# Patient Record
Sex: FEMALE | Race: WHITE | NOT HISPANIC OR LATINO | Employment: OTHER | URBAN - METROPOLITAN AREA
[De-identification: names, ages, dates, MRNs, and addresses within clinical notes are randomized per-mention and may not be internally consistent; named-entity substitution may affect disease eponyms.]

---

## 2022-11-01 NOTE — H&P (VIEW-ONLY)
GYNECOLOGIC ONCOLOGY CONSULT    Patient Name: RODOLFO TRIPLETT Patient : 1975   Patient MRN: [id number]    Referring Physician: Dmitri Copeland MD   Primary GYNOncologist: Clari Perez (Gynecological/Oncology)  Date of Service: 10/27/2022     Reason for Consult:  Consultation and treatment recommendations.     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 She presents today for consultation and treatment recommendations.    Clinical Course:  1/22/15: The patient was experiencing abnormal uterine bleeding and cramping. Her Pap smear was negative HPV. Pelvic US showed fibroids and possible polyp. EMB showed no evidence of malignancy, hyperplasia, or endometritis.   2/18/15: The patient underwent laparoscopic hysterectomy and bilateral salpingectomy. Pathology showed multiple intramural and subserosal leiomyoma.   7/3/17: She underwent EUA and excision of hypertrophic anal papilla. Pathology showed invasive well-differentiated squamous cell carcinoma.   She was treated with chemotherapy and radiation therapy for her stage I anal cancer in 2017.   18: CT AP showed no evidence of metastatic disease in the abdomen or pelvis. Hazy inflammatory stranding in the low pelvis favored to be on the basis of treatment related effect. Mild thickening of the urinary bladder wall, also favored to be treatment related effect, alternatively, could be on the basis of cystitis.   18: MEFV-gene analysis was negative.   20: CT AP showed no acute inflammatory process in the abdomen or pelvis. Rectum is largely decompressed. No definite rectal wall thickening or mass is identified. Tiny focus of intraluminal air in the bladder but no bladder wall thickening.   10/3/22: The patient had had a  skin tag/lesion on the left side of her labia, initially noticed in 2017. It has changed in size and caused more irritation, redness, and itching over time. She underwent excision of the left labia lesion on 10/3/22. Pathology showed extensive high-grade squamous intraepithelial lesion (GILBERTO-3) with adnexal extension. Surgical margins were all positive for GILBERTO-3.   Genetic Testing (Gyn Oncology):  Negative MEFV-gene.     Interval History (Gyn Oncology):  The patient presents to the clinic today for further consultation and treatment recommendations. She reports she is doing well. She mentions that she initially noticed a skin tag/lesion on the left side of her labia in 2017. This has changed in size over the years and has become more symptomatic recently. She mentions that she has experienced associated itching, especially after showering, sweating, etc. She also reports associated pain. These symptoms have improved significantly after she underwent the excision of the left labia lesion on 10/3/22. She is doing well symptomatically today. However, she mentions that she has been experiencing pain in her hip region. She does have arthritis in her back and hips. She denies associated fever, nausea, vomiting, shortness of breath, chest pain, cough, night sweats, and chills. She denies any abnormal vaginal bleeding or pelvic pain. She has no abdominal pain and no new changes in her bladder or bowel habits. She denies dysuria, hematuria, melena, or bright red blood per rectum. She has no abnormal lower extremity edema. She denies peripheral neuropathy.     Review of Systems:  A complete 14-point review of systems is negative except as noted in the above history of present illness.    Past Medical History:  Anal cancer   Mixed hyperlipidemia   Epigastric pain  Anxiety   Migraines  PID  Epilepsy    Surgical History:  Removal of single anal tag     EUA  Laparoscopic hysterectomy   Bilateral salpingectomy    OB/Gyn  History:  .  x2,  x1. Menarche at age 10. LMP: 2/5/15.     Allergies#  Tamiflu    Medications:  Atorvastatin Oral 10 mg tablet  Calcium Carbonate Oral 600 mg calcium (1,500 mg) tablet  Multivitamins Oral Tablet  Family History:  Mother: diabetes  Father: HTN, prostate cancer   MGF: cancer     Social History:  . Lives with  and 4 children. Works part time as caregiver for elderly. Currently every day smoker; smokes half a pack of cigarettes daily. Never used tobacco or illicit drugs. Former vape user. Does not consume alcohol.     Health Maintenance:      Vital Signs:  Blood pressure: 125/82, Pulse: 103, Temperature: , Respirations: 16, O2 sat: 98%, Pain Scale: 0, Height: 66.5 in, Weight: 166.8 lb, BSA: 1.86, BMI: 26.52 kg/m2     Physical Exam (Gyn Oncology):  General: Alert and oriented x3, no acute distress  Skin: Clean, dry and intact.  HEENT: Normocephalic, atraumatic  Lymph: No cervical, supraclavicular, inguinal, or femoral adenopathy.  CV: Regular rate and rhythm, no murmurs, rubs or gallops  Resp: Clear to auscultation bilaterally, no wheezes, rales or rhonchi  Abdomen: Soft, non-tender to palpation, no rebound or guarding  Lower Extremity Exam: No lower extremity edema, no palpable cords  Neuro: Cranial Nerves 2-12 intact, gross motor skills intact  Genitourinary exam: Examination of the external genitalia reveals a slightly bilobed 2 x 2.3 cm beefy mildly ulcerative lesion, consistent with at least GILBERTO-3. An invasive component to the lesion is not definitively identified. On speculum exam, the vaginal cuff mucosa is within normal limits without any growths of lesions. There is a mild white vaginal discharge. A bimanual exam was not performed. The adnexal lobe lesion that was hit the bilobed lesion of the lesion approximates the left lateral aspect of the patient's clitoris, however, is sufficiently distanced from the left lateral aspect of the urethra.     Laboratory  Data:         Imagin18: CT AP  IMPRESSION:  1. No evidence of metastatic disease in the abdomen or pelvis.   2. Hazy inflammatory stranding in the low pelvis favored to be on the basis of treatment related effect.   3. Mild thickening of the urinary bladder wall, also favored to be treatment related effect, alternatively, could be on the basis of cystitis.     20: CT AP  IMPRESSION:  1. No acute inflammatory process in the abdomen or pelvis.   2. Rectum is largely decompressed. No definite rectal wall thickening or mass is identified.   3. Tiny focus of intraluminal air in the bladder but no bladder wall thickening.     PATHOLOGY:  A) VULVA, LEFT LABIA, 2 O'CLOCK LATERAL TO LESION, BX: 10/3/22  1. Extensive high-grade squamous intraepithelial lesion (GILBERTO-3) with adnexal extension.  2. Focus suspicious for but not diagnostic of superficially invasive squamous cell carcinoma.     B) VULVA, LEFT LABIA, 2 O'CLOCK LESION, EXCISION: 10/3/22  1. Extensive high-grade squamous intraepithelial lesion (GILBERTO-3) with adnexal extension.  2.  Surgical margins were all positive for GILBERTO-3.     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 She presents today for consultation and treatment recommendations.  At least GILBERTO-3 of the left anterior vulva: At this time, I am unable to definitively determine if the lesion of the left lateral vulva is consistent with GILBERTO-3 or an invasive carcinoma. Therefore, I would not proceed with an upfront sentinel lymph node injection and biopsy but will perform a simple vulvectomy with local excision in order to rule out an invasive carcinoma. I reviewed with Latanya and her  in great detail that if an invasive carcinoma is identified, further surgical excision will be  required. If an invasive carcinoma is identified, we will additionally perform sentinel lymph node injection and biopsy. We discussed that if the lesion were to be consistent with a GILBERTO-3 lesion only, we would be done with excision following these simple vulvectomy. We reviewed the risks of surgery that do include bleeding, infection, and potential damage to the surrounding structures. We reviewed the general perioperative expectations. She will need a preoperative H&P and a Covid-19 test prior to the surgery. It appears as though we can schedule her procedure for November 15th.  Tobacco dependence: I discussed with Latanya and her  the risk of recurrence and progression of disease and relationship to not only her current vulgar process but also her history of anal cancer with persistent smoking. We discussed an underlying component to an infection with HPV and also discussed the correlation between smoking and progression of disease and increased risk of recurrence. Latanya was surprised to hear that her smoking could contribute to development of her cancer. She was advised also to stop smoking as soon as reasonable.  History of stage I squamous cell carcinoma of the anus: Status post chemotherapy sensitizing pelvic radiation. Due to the history of pelvic radiation, Latanya is likely to struggle with issues related to wound healing. I have strongly advised that she discontinue smoking prior to surgery in order to optimize wound healing to the best of her ability.  HealthCare Maintenance: Continue routine healthcare follow up with her primary care provider.   Thank you very much for allowing me to take part in the care of this very sweet patient.    3 minutes in reviewing records, 36 minutes in discussion and exam, 2 minutes ordering labs and sending prescriptions.    Pain Care Management:  Pain Scale: 0    Patient Care needs:  Depressions Status: Not recorded on visit  Smoking Status: Current every day  smoker    Documentation assistance provided by kitty Loveibing for Dr. Clari Gamez, on 10/28/2022. The patient and family/friends if present were apprised of the use of a scribe through remote viewing and all parties consented to conducting the visit in this manner.    Clari Perez MD  Phone: [phone]  Fax: [phone]

## 2022-11-01 NOTE — CONSULTS
GYNECOLOGIC ONCOLOGY CONSULT    Patient Name: RODOLFO TRIPLETT Patient : 1975   Patient MRN: [id number]    Referring Physician: Dmitri Copeland MD   Primary GYNOncologist: Clari Perez (Gynecological/Oncology)  Date of Service: 10/27/2022     Reason for Consult:  Consultation and treatment recommendations.     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 She presents today for consultation and treatment recommendations.    Clinical Course:  1/22/15: The patient was experiencing abnormal uterine bleeding and cramping. Her Pap smear was negative HPV. Pelvic US showed fibroids and possible polyp. EMB showed no evidence of malignancy, hyperplasia, or endometritis.   2/18/15: The patient underwent laparoscopic hysterectomy and bilateral salpingectomy. Pathology showed multiple intramural and subserosal leiomyoma.   7/3/17: She underwent EUA and excision of hypertrophic anal papilla. Pathology showed invasive well-differentiated squamous cell carcinoma.   She was treated with chemotherapy and radiation therapy for her stage I anal cancer in 2017.   18: CT AP showed no evidence of metastatic disease in the abdomen or pelvis. Hazy inflammatory stranding in the low pelvis favored to be on the basis of treatment related effect. Mild thickening of the urinary bladder wall, also favored to be treatment related effect, alternatively, could be on the basis of cystitis.   18: MEFV-gene analysis was negative.   20: CT AP showed no acute inflammatory process in the abdomen or pelvis. Rectum is largely decompressed. No definite rectal wall thickening or mass is identified. Tiny focus of intraluminal air in the bladder but no bladder wall thickening.   10/3/22: The patient had had a  skin tag/lesion on the left side of her labia, initially noticed in 2017. It has changed in size and caused more irritation, redness, and itching over time. She underwent excision of the left labia lesion on 10/3/22. Pathology showed extensive high-grade squamous intraepithelial lesion (GILBERTO-3) with adnexal extension. Surgical margins were all positive for GILBERTO-3.   Genetic Testing (Gyn Oncology):  Negative MEFV-gene.     Interval History (Gyn Oncology):  The patient presents to the clinic today for further consultation and treatment recommendations. She reports she is doing well. She mentions that she initially noticed a skin tag/lesion on the left side of her labia in 2017. This has changed in size over the years and has become more symptomatic recently. She mentions that she has experienced associated itching, especially after showering, sweating, etc. She also reports associated pain. These symptoms have improved significantly after she underwent the excision of the left labia lesion on 10/3/22. She is doing well symptomatically today. However, she mentions that she has been experiencing pain in her hip region. She does have arthritis in her back and hips. She denies associated fever, nausea, vomiting, shortness of breath, chest pain, cough, night sweats, and chills. She denies any abnormal vaginal bleeding or pelvic pain. She has no abdominal pain and no new changes in her bladder or bowel habits. She denies dysuria, hematuria, melena, or bright red blood per rectum. She has no abnormal lower extremity edema. She denies peripheral neuropathy.     Review of Systems:  A complete 14-point review of systems is negative except as noted in the above history of present illness.    Past Medical History:  Anal cancer   Mixed hyperlipidemia   Epigastric pain  Anxiety   Migraines  PID  Epilepsy    Surgical History:  Removal of single anal tag     EUA  Laparoscopic hysterectomy   Bilateral salpingectomy    OB/Gyn  History:  .  x2,  x1. Menarche at age 10. LMP: 2/5/15.     Allergies#  Tamiflu    Medications:  Atorvastatin Oral 10 mg tablet  Calcium Carbonate Oral 600 mg calcium (1,500 mg) tablet  Multivitamins Oral Tablet  Family History:  Mother: diabetes  Father: HTN, prostate cancer   MGF: cancer     Social History:  . Lives with  and 4 children. Works part time as caregiver for elderly. Currently every day smoker; smokes half a pack of cigarettes daily. Never used tobacco or illicit drugs. Former vape user. Does not consume alcohol.     Health Maintenance:      Vital Signs:  Blood pressure: 125/82, Pulse: 103, Temperature: , Respirations: 16, O2 sat: 98%, Pain Scale: 0, Height: 66.5 in, Weight: 166.8 lb, BSA: 1.86, BMI: 26.52 kg/m2     Physical Exam (Gyn Oncology):  General: Alert and oriented x3, no acute distress  Skin: Clean, dry and intact.  HEENT: Normocephalic, atraumatic  Lymph: No cervical, supraclavicular, inguinal, or femoral adenopathy.  CV: Regular rate and rhythm, no murmurs, rubs or gallops  Resp: Clear to auscultation bilaterally, no wheezes, rales or rhonchi  Abdomen: Soft, non-tender to palpation, no rebound or guarding  Lower Extremity Exam: No lower extremity edema, no palpable cords  Neuro: Cranial Nerves 2-12 intact, gross motor skills intact  Genitourinary exam: Examination of the external genitalia reveals a slightly bilobed 2 x 2.3 cm beefy mildly ulcerative lesion, consistent with at least GILBERTO-3. An invasive component to the lesion is not definitively identified. On speculum exam, the vaginal cuff mucosa is within normal limits without any growths of lesions. There is a mild white vaginal discharge. A bimanual exam was not performed. The adnexal lobe lesion that was hit the bilobed lesion of the lesion approximates the left lateral aspect of the patient's clitoris, however, is sufficiently distanced from the left lateral aspect of the urethra.     Laboratory  Data:         Imagin18: CT AP  IMPRESSION:  1. No evidence of metastatic disease in the abdomen or pelvis.   2. Hazy inflammatory stranding in the low pelvis favored to be on the basis of treatment related effect.   3. Mild thickening of the urinary bladder wall, also favored to be treatment related effect, alternatively, could be on the basis of cystitis.     20: CT AP  IMPRESSION:  1. No acute inflammatory process in the abdomen or pelvis.   2. Rectum is largely decompressed. No definite rectal wall thickening or mass is identified.   3. Tiny focus of intraluminal air in the bladder but no bladder wall thickening.     PATHOLOGY:  A) VULVA, LEFT LABIA, 2 O'CLOCK LATERAL TO LESION, BX: 10/3/22  1. Extensive high-grade squamous intraepithelial lesion (GILBERTO-3) with adnexal extension.  2. Focus suspicious for but not diagnostic of superficially invasive squamous cell carcinoma.     B) VULVA, LEFT LABIA, 2 O'CLOCK LESION, EXCISION: 10/3/22  1. Extensive high-grade squamous intraepithelial lesion (GILBERTO-3) with adnexal extension.  2.  Surgical margins were all positive for GILBERTO-3.     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 She presents today for consultation and treatment recommendations.  At least GILBERTO-3 of the left anterior vulva: At this time, I am unable to definitively determine if the lesion of the left lateral vulva is consistent with GILBERTO-3 or an invasive carcinoma. Therefore, I would not proceed with an upfront sentinel lymph node injection and biopsy but will perform a simple vulvectomy with local excision in order to rule out an invasive carcinoma. I reviewed with Latanya and her  in great detail that if an invasive carcinoma is identified, further surgical excision will be  required. If an invasive carcinoma is identified, we will additionally perform sentinel lymph node injection and biopsy. We discussed that if the lesion were to be consistent with a GILBERTO-3 lesion only, we would be done with excision following these simple vulvectomy. We reviewed the risks of surgery that do include bleeding, infection, and potential damage to the surrounding structures. We reviewed the general perioperative expectations. She will need a preoperative H&P and a Covid-19 test prior to the surgery. It appears as though we can schedule her procedure for November 15th.  Tobacco dependence: I discussed with Latanya and her  the risk of recurrence and progression of disease and relationship to not only her current vulgar process but also her history of anal cancer with persistent smoking. We discussed an underlying component to an infection with HPV and also discussed the correlation between smoking and progression of disease and increased risk of recurrence. Latanya was surprised to hear that her smoking could contribute to development of her cancer. She was advised also to stop smoking as soon as reasonable.  History of stage I squamous cell carcinoma of the anus: Status post chemotherapy sensitizing pelvic radiation. Due to the history of pelvic radiation, Latanya is likely to struggle with issues related to wound healing. I have strongly advised that she discontinue smoking prior to surgery in order to optimize wound healing to the best of her ability.  HealthCare Maintenance: Continue routine healthcare follow up with her primary care provider.   Thank you very much for allowing me to take part in the care of this very sweet patient.    3 minutes in reviewing records, 36 minutes in discussion and exam, 2 minutes ordering labs and sending prescriptions.    Pain Care Management:  Pain Scale: 0    Patient Care needs:  Depressions Status: Not recorded on visit  Smoking Status: Current every day  smoker    Documentation assistance provided by kitty Loveibing for Dr. Clari Gamez, on 10/28/2022. The patient and family/friends if present were apprised of the use of a scribe through remote viewing and all parties consented to conducting the visit in this manner.    Clari Perez MD  Phone: [phone]  Fax: [phone]

## 2022-11-14 RX ORDER — ATORVASTATIN CALCIUM 10 MG/1
10 TABLET, FILM COATED ORAL DAILY
COMMUNITY

## 2022-11-15 ENCOUNTER — ANESTHESIA EVENT (OUTPATIENT)
Dept: SURGERY | Facility: HOSPITAL | Age: 47
End: 2022-11-15
Payer: COMMERCIAL

## 2022-11-15 ENCOUNTER — ANESTHESIA (OUTPATIENT)
Dept: SURGERY | Facility: HOSPITAL | Age: 47
End: 2022-11-15
Payer: COMMERCIAL

## 2022-11-15 ENCOUNTER — HOSPITAL ENCOUNTER (OUTPATIENT)
Facility: HOSPITAL | Age: 47
Discharge: HOME | End: 2022-11-15
Attending: OBSTETRICS & GYNECOLOGY | Admitting: OBSTETRICS & GYNECOLOGY
Payer: COMMERCIAL

## 2022-11-15 VITALS
TEMPERATURE: 98.2 F | DIASTOLIC BLOOD PRESSURE: 63 MMHG | WEIGHT: 169.3 LBS | SYSTOLIC BLOOD PRESSURE: 138 MMHG | RESPIRATION RATE: 20 BRPM | OXYGEN SATURATION: 98 % | HEART RATE: 75 BPM

## 2022-11-15 DIAGNOSIS — G89.18 POSTOPERATIVE PAIN: Primary | ICD-10-CM

## 2022-11-15 LAB
HCG INTACT+B SERPL-ACNC: 1 MIU/ML
HOLD SPECIMEN: NORMAL

## 2022-11-15 PROCEDURE — 272N000001 HC OR GENERAL SUPPLY STERILE: Performed by: OBSTETRICS & GYNECOLOGY

## 2022-11-15 PROCEDURE — 250N000013 HC RX MED GY IP 250 OP 250 PS 637: Performed by: ANESTHESIOLOGY

## 2022-11-15 PROCEDURE — 88309 TISSUE EXAM BY PATHOLOGIST: CPT | Mod: TC | Performed by: OBSTETRICS & GYNECOLOGY

## 2022-11-15 PROCEDURE — 710N000012 HC RECOVERY PHASE 2, PER MINUTE: Performed by: OBSTETRICS & GYNECOLOGY

## 2022-11-15 PROCEDURE — 250N000009 HC RX 250: Performed by: NURSE ANESTHETIST, CERTIFIED REGISTERED

## 2022-11-15 PROCEDURE — 258N000003 HC RX IP 258 OP 636: Performed by: ANESTHESIOLOGY

## 2022-11-15 PROCEDURE — 999N000141 HC STATISTIC PRE-PROCEDURE NURSING ASSESSMENT: Performed by: OBSTETRICS & GYNECOLOGY

## 2022-11-15 PROCEDURE — 250N000011 HC RX IP 250 OP 636: Performed by: NURSE ANESTHETIST, CERTIFIED REGISTERED

## 2022-11-15 PROCEDURE — 710N000009 HC RECOVERY PHASE 1, LEVEL 1, PER MIN: Performed by: OBSTETRICS & GYNECOLOGY

## 2022-11-15 PROCEDURE — 250N000025 HC SEVOFLURANE, PER MIN: Performed by: OBSTETRICS & GYNECOLOGY

## 2022-11-15 PROCEDURE — 84702 CHORIONIC GONADOTROPIN TEST: CPT | Performed by: PHYSICIAN ASSISTANT

## 2022-11-15 PROCEDURE — 250N000011 HC RX IP 250 OP 636: Performed by: PHYSICIAN ASSISTANT

## 2022-11-15 PROCEDURE — 250N000009 HC RX 250: Performed by: OBSTETRICS & GYNECOLOGY

## 2022-11-15 PROCEDURE — 370N000017 HC ANESTHESIA TECHNICAL FEE, PER MIN: Performed by: OBSTETRICS & GYNECOLOGY

## 2022-11-15 PROCEDURE — 88309 TISSUE EXAM BY PATHOLOGIST: CPT | Mod: 26 | Performed by: PATHOLOGY

## 2022-11-15 PROCEDURE — 36415 COLL VENOUS BLD VENIPUNCTURE: CPT | Performed by: PHYSICIAN ASSISTANT

## 2022-11-15 PROCEDURE — 360N000075 HC SURGERY LEVEL 2, PER MIN: Performed by: OBSTETRICS & GYNECOLOGY

## 2022-11-15 PROCEDURE — 258N000003 HC RX IP 258 OP 636: Performed by: NURSE ANESTHETIST, CERTIFIED REGISTERED

## 2022-11-15 RX ORDER — NALOXONE HYDROCHLORIDE 0.4 MG/ML
0.4 INJECTION, SOLUTION INTRAMUSCULAR; INTRAVENOUS; SUBCUTANEOUS
Status: DISCONTINUED | OUTPATIENT
Start: 2022-11-15 | End: 2022-11-15 | Stop reason: HOSPADM

## 2022-11-15 RX ORDER — LIDOCAINE HYDROCHLORIDE 10 MG/ML
INJECTION, SOLUTION INFILTRATION; PERINEURAL PRN
Status: DISCONTINUED | OUTPATIENT
Start: 2022-11-15 | End: 2022-11-15

## 2022-11-15 RX ORDER — ACETAMINOPHEN 325 MG/1
975 TABLET ORAL ONCE
Status: COMPLETED | OUTPATIENT
Start: 2022-11-15 | End: 2022-11-15

## 2022-11-15 RX ORDER — KETOROLAC TROMETHAMINE 30 MG/ML
INJECTION, SOLUTION INTRAMUSCULAR; INTRAVENOUS PRN
Status: DISCONTINUED | OUTPATIENT
Start: 2022-11-15 | End: 2022-11-15

## 2022-11-15 RX ORDER — HYDROMORPHONE HYDROCHLORIDE 1 MG/ML
0.2 INJECTION, SOLUTION INTRAMUSCULAR; INTRAVENOUS; SUBCUTANEOUS EVERY 5 MIN PRN
Status: DISCONTINUED | OUTPATIENT
Start: 2022-11-15 | End: 2022-11-15 | Stop reason: HOSPADM

## 2022-11-15 RX ORDER — OXYCODONE HYDROCHLORIDE 5 MG/1
5 TABLET ORAL
Status: DISCONTINUED | OUTPATIENT
Start: 2022-11-15 | End: 2022-11-15 | Stop reason: HOSPADM

## 2022-11-15 RX ORDER — FENTANYL CITRATE 50 UG/ML
25 INJECTION, SOLUTION INTRAMUSCULAR; INTRAVENOUS EVERY 5 MIN PRN
Status: DISCONTINUED | OUTPATIENT
Start: 2022-11-15 | End: 2022-11-15 | Stop reason: HOSPADM

## 2022-11-15 RX ORDER — SODIUM CHLORIDE, SODIUM LACTATE, POTASSIUM CHLORIDE, CALCIUM CHLORIDE 600; 310; 30; 20 MG/100ML; MG/100ML; MG/100ML; MG/100ML
INJECTION, SOLUTION INTRAVENOUS CONTINUOUS
Status: DISCONTINUED | OUTPATIENT
Start: 2022-11-15 | End: 2022-11-15 | Stop reason: HOSPADM

## 2022-11-15 RX ORDER — NALOXONE HYDROCHLORIDE 0.4 MG/ML
0.2 INJECTION, SOLUTION INTRAMUSCULAR; INTRAVENOUS; SUBCUTANEOUS
Status: DISCONTINUED | OUTPATIENT
Start: 2022-11-15 | End: 2022-11-15 | Stop reason: HOSPADM

## 2022-11-15 RX ORDER — CEFAZOLIN SODIUM/WATER 2 G/20 ML
2 SYRINGE (ML) INTRAVENOUS SEE ADMIN INSTRUCTIONS
Status: DISCONTINUED | OUTPATIENT
Start: 2022-11-15 | End: 2022-11-15 | Stop reason: HOSPADM

## 2022-11-15 RX ORDER — ACETIC ACID 3 %
LIQUID (ML) MISCELLANEOUS
Status: DISCONTINUED | OUTPATIENT
Start: 2022-11-15 | End: 2022-11-15 | Stop reason: HOSPADM

## 2022-11-15 RX ORDER — ONDANSETRON 2 MG/ML
INJECTION INTRAMUSCULAR; INTRAVENOUS PRN
Status: DISCONTINUED | OUTPATIENT
Start: 2022-11-15 | End: 2022-11-15

## 2022-11-15 RX ORDER — PROPOFOL 10 MG/ML
INJECTION, EMULSION INTRAVENOUS CONTINUOUS PRN
Status: DISCONTINUED | OUTPATIENT
Start: 2022-11-15 | End: 2022-11-15

## 2022-11-15 RX ORDER — OXYCODONE HYDROCHLORIDE 5 MG/1
5 TABLET ORAL EVERY 4 HOURS PRN
Status: DISCONTINUED | OUTPATIENT
Start: 2022-11-15 | End: 2022-11-15 | Stop reason: HOSPADM

## 2022-11-15 RX ORDER — AMOXICILLIN 250 MG
1-2 CAPSULE ORAL 2 TIMES DAILY PRN
Qty: 30 TABLET | Refills: 0 | COMMUNITY
Start: 2022-11-15 | End: 2023-01-02

## 2022-11-15 RX ORDER — ACETAMINOPHEN 325 MG/1
975 TABLET ORAL EVERY 6 HOURS PRN
Status: DISCONTINUED | OUTPATIENT
Start: 2022-11-15 | End: 2022-11-15 | Stop reason: HOSPADM

## 2022-11-15 RX ORDER — MEPERIDINE HYDROCHLORIDE 25 MG/ML
12.5 INJECTION INTRAMUSCULAR; INTRAVENOUS; SUBCUTANEOUS
Status: DISCONTINUED | OUTPATIENT
Start: 2022-11-15 | End: 2022-11-15 | Stop reason: HOSPADM

## 2022-11-15 RX ORDER — ONDANSETRON 2 MG/ML
4 INJECTION INTRAMUSCULAR; INTRAVENOUS EVERY 30 MIN PRN
Status: DISCONTINUED | OUTPATIENT
Start: 2022-11-15 | End: 2022-11-15 | Stop reason: HOSPADM

## 2022-11-15 RX ORDER — FENTANYL CITRATE 50 UG/ML
25 INJECTION, SOLUTION INTRAMUSCULAR; INTRAVENOUS
Status: DISCONTINUED | OUTPATIENT
Start: 2022-11-15 | End: 2022-11-15 | Stop reason: HOSPADM

## 2022-11-15 RX ORDER — IBUPROFEN 200 MG
800 TABLET ORAL EVERY 6 HOURS PRN
Status: DISCONTINUED | OUTPATIENT
Start: 2022-11-15 | End: 2022-11-15 | Stop reason: HOSPADM

## 2022-11-15 RX ORDER — ONDANSETRON 4 MG/1
4 TABLET, ORALLY DISINTEGRATING ORAL EVERY 30 MIN PRN
Status: DISCONTINUED | OUTPATIENT
Start: 2022-11-15 | End: 2022-11-15 | Stop reason: HOSPADM

## 2022-11-15 RX ORDER — FENTANYL CITRATE 50 UG/ML
INJECTION, SOLUTION INTRAMUSCULAR; INTRAVENOUS PRN
Status: DISCONTINUED | OUTPATIENT
Start: 2022-11-15 | End: 2022-11-15

## 2022-11-15 RX ORDER — IBUPROFEN 600 MG/1
600 TABLET, FILM COATED ORAL EVERY 6 HOURS PRN
Qty: 20 TABLET | Refills: 0 | Status: ON HOLD | OUTPATIENT
Start: 2022-11-15 | End: 2023-01-03

## 2022-11-15 RX ORDER — CEFAZOLIN SODIUM/WATER 2 G/20 ML
2 SYRINGE (ML) INTRAVENOUS
Status: COMPLETED | OUTPATIENT
Start: 2022-11-15 | End: 2022-11-15

## 2022-11-15 RX ORDER — MAGNESIUM HYDROXIDE 1200 MG/15ML
LIQUID ORAL PRN
Status: DISCONTINUED | OUTPATIENT
Start: 2022-11-15 | End: 2022-11-15 | Stop reason: HOSPADM

## 2022-11-15 RX ORDER — LIDOCAINE 40 MG/G
CREAM TOPICAL
Status: DISCONTINUED | OUTPATIENT
Start: 2022-11-15 | End: 2022-11-15 | Stop reason: HOSPADM

## 2022-11-15 RX ORDER — OXYCODONE HYDROCHLORIDE 5 MG/1
5 TABLET ORAL EVERY 4 HOURS PRN
Qty: 12 TABLET | Refills: 0 | Status: SHIPPED | OUTPATIENT
Start: 2022-11-15 | End: 2023-01-02

## 2022-11-15 RX ORDER — DEXAMETHASONE SODIUM PHOSPHATE 10 MG/ML
INJECTION, SOLUTION INTRAMUSCULAR; INTRAVENOUS PRN
Status: DISCONTINUED | OUTPATIENT
Start: 2022-11-15 | End: 2022-11-15

## 2022-11-15 RX ORDER — HYDROXYZINE HYDROCHLORIDE 25 MG/1
25 TABLET, FILM COATED ORAL
Status: DISCONTINUED | OUTPATIENT
Start: 2022-11-15 | End: 2022-11-15 | Stop reason: HOSPADM

## 2022-11-15 RX ORDER — PROPOFOL 10 MG/ML
INJECTION, EMULSION INTRAVENOUS PRN
Status: DISCONTINUED | OUTPATIENT
Start: 2022-11-15 | End: 2022-11-15

## 2022-11-15 RX ORDER — ACETAMINOPHEN 325 MG/1
975 TABLET ORAL EVERY 6 HOURS PRN
Qty: 50 TABLET | Refills: 0 | COMMUNITY
Start: 2022-11-15

## 2022-11-15 RX ORDER — LIDOCAINE HYDROCHLORIDE 20 MG/ML
INJECTION, SOLUTION INFILTRATION; PERINEURAL PRN
Status: DISCONTINUED | OUTPATIENT
Start: 2022-11-15 | End: 2022-11-15 | Stop reason: HOSPADM

## 2022-11-15 RX ADMIN — MIDAZOLAM 2 MG: 1 INJECTION INTRAMUSCULAR; INTRAVENOUS at 10:39

## 2022-11-15 RX ADMIN — OXYCODONE HYDROCHLORIDE 5 MG: 5 TABLET ORAL at 12:59

## 2022-11-15 RX ADMIN — PROPOFOL 30 MCG/KG/MIN: 10 INJECTION, EMULSION INTRAVENOUS at 10:45

## 2022-11-15 RX ADMIN — PHENYLEPHRINE HYDROCHLORIDE 100 MCG: 10 INJECTION INTRAVENOUS at 11:09

## 2022-11-15 RX ADMIN — ACETAMINOPHEN 975 MG: 325 TABLET, FILM COATED ORAL at 08:59

## 2022-11-15 RX ADMIN — PROPOFOL 200 MG: 10 INJECTION, EMULSION INTRAVENOUS at 10:43

## 2022-11-15 RX ADMIN — DEXAMETHASONE SODIUM PHOSPHATE 10 MG: 10 INJECTION, SOLUTION INTRAMUSCULAR; INTRAVENOUS at 10:46

## 2022-11-15 RX ADMIN — Medication 2 G: at 10:39

## 2022-11-15 RX ADMIN — SODIUM CHLORIDE, POTASSIUM CHLORIDE, SODIUM LACTATE AND CALCIUM CHLORIDE: 600; 310; 30; 20 INJECTION, SOLUTION INTRAVENOUS at 09:09

## 2022-11-15 RX ADMIN — PHENYLEPHRINE HYDROCHLORIDE 100 MCG: 10 INJECTION INTRAVENOUS at 11:15

## 2022-11-15 RX ADMIN — PHENYLEPHRINE HYDROCHLORIDE 100 MCG: 10 INJECTION INTRAVENOUS at 10:46

## 2022-11-15 RX ADMIN — ONDANSETRON 4 MG: 2 INJECTION INTRAMUSCULAR; INTRAVENOUS at 10:46

## 2022-11-15 RX ADMIN — KETOROLAC TROMETHAMINE 30 MG: 30 INJECTION, SOLUTION INTRAMUSCULAR at 11:21

## 2022-11-15 RX ADMIN — FENTANYL CITRATE 100 MCG: 50 INJECTION, SOLUTION INTRAMUSCULAR; INTRAVENOUS at 10:43

## 2022-11-15 RX ADMIN — PHENYLEPHRINE HYDROCHLORIDE 100 MCG: 10 INJECTION INTRAVENOUS at 10:50

## 2022-11-15 RX ADMIN — LIDOCAINE HYDROCHLORIDE 5 ML: 10 INJECTION, SOLUTION INFILTRATION; PERINEURAL at 10:43

## 2022-11-15 RX ADMIN — PHENYLEPHRINE HYDROCHLORIDE 100 MCG: 10 INJECTION INTRAVENOUS at 11:12

## 2022-11-15 ASSESSMENT — ACTIVITIES OF DAILY LIVING (ADL)
ADLS_ACUITY_SCORE: 20

## 2022-11-15 ASSESSMENT — LIFESTYLE VARIABLES: TOBACCO_USE: 1

## 2022-11-15 NOTE — ANESTHESIA PREPROCEDURE EVALUATION
Anesthesia Pre-Procedure Evaluation    Patient: Latanya Triplett   MRN: [id number] : 1975        Procedure : Procedure(s):  SIMPLE VULVECTOMY          Past Medical History:   Diagnosis Date     Malignant neoplasm of anal canal (H)      Mixed hyperlipidemia      Squamous cell carcinoma of vulva (H)       Past Surgical History:   Procedure Laterality Date     GYN SURGERY       ORTHOPEDIC SURGERY        Allergies   Allergen Reactions     Tamiflu [Oseltamivir] Rash      Social History     Tobacco Use     Smoking status: Every Day     Packs/day: 0.25     Years: 20.00     Pack years: 5.00     Types: Cigarettes     Smokeless tobacco: Former     Tobacco comments:     Vape   Substance Use Topics     Alcohol use: Not Currently      Wt Readings from Last 1 Encounters:   No data found for Wt        Anesthesia Evaluation            ROS/MED HX  ENT/Pulmonary:     (+) tobacco use,     Neurologic:  - neg neurologic ROS     Cardiovascular:  - neg cardiovascular ROS     METS/Exercise Tolerance:     Hematologic:  - neg hematologic  ROS     Musculoskeletal:       GI/Hepatic:  - neg GI/hepatic ROS     Renal/Genitourinary:  - neg Renal ROS     Endo:  - neg endo ROS     Psychiatric/Substance Use:  - neg psychiatric ROS     Infectious Disease:       Malignancy:       Other:            Physical Exam    Airway  airway exam normal      Mallampati: I   TM distance: > 3 FB   Neck ROM: full   Mouth opening: > 3 cm    Respiratory Devices and Support         Dental  no notable dental history         Cardiovascular   cardiovascular exam normal          Pulmonary   pulmonary exam normal                OUTSIDE LABS:  CBC: No results found for: WBC, HGB, HCT, PLT  BMP: No results found for: NA, POTASSIUM, CHLORIDE, CO2, BUN, CR, GLC  COAGS: No results found for: PTT, INR, FIBR  POC: No results found for: BGM, HCG, HCGS  HEPATIC: No results found for: ALBUMIN, PROTTOTAL, ALT, AST, GGT, ALKPHOS, BILITOTAL, BILIDIRECT, CHINMAY  OTHER: No results  found for: PH, LACT, A1C, NELIA, PHOS, MAG, LIPASE, AMYLASE, TSH, T4, T3, CRP, SED    Anesthesia Plan    ASA Status:  2   NPO Status:  NPO Appropriate    Anesthesia Type: General.     - Airway: LMA   Induction: Intravenous.   Maintenance: Balanced.        Consents    Anesthesia Plan(s) and associated risks, benefits, and realistic alternatives discussed. Questions answered and patient/representative(s) expressed understanding.    - Discussed:     - Discussed with:  Patient      - Extended Intubation/Ventilatory Support Discussed: No.      - Patient is DNR/DNI Status: No         Postoperative Care    Pain management: Multi-modal analgesia.   PONV prophylaxis: Ondansetron (or other 5HT-3), Dexamethasone or Solumedrol     Comments:    Other Comments: GA - LMA  1 PIV   Fentanyl prn, dilaudid prn, toradol if OK with surgeon  bala Rolon MD

## 2022-11-15 NOTE — ANESTHESIA POSTPROCEDURE EVALUATION
Patient: Latanya Triplett    Procedure: Procedure(s):  SIMPLE VULVECTOMY - LEFT       Anesthesia Type:  General    Note:     Postop Pain Control: Uneventful            Sign Out: Well controlled pain   PONV: No   Neuro/Psych: Uneventful            Sign Out: Acceptable/Baseline neuro status   Airway/Respiratory: Uneventful            Sign Out: Acceptable/Baseline resp. status   CV/Hemodynamics: Uneventful            Sign Out: Acceptable CV status; No obvious hypovolemia; No obvious fluid overload   Other NRE: NONE   DID A NON-ROUTINE EVENT OCCUR? No           Last vitals:  Vitals Value Taken Time   /65 11/15/22 1230   Temp 36.7  C (98  F) 11/15/22 1133   Pulse 79 11/15/22 1235   Resp 20 11/15/22 1235   SpO2 96 % 11/15/22 1235   Vitals shown include unvalidated device data.    Electronically Signed By: Marilee Stanley MD  November 15, 2022  12:46 PM

## 2022-11-15 NOTE — PROCEDURES
DATE OF SERVICE:    11/15/2022      SURGEON:    Clari Perez MD     PREOPERATIVE DIAGNOSIS:   GILBERTO 3    POSTOPERATIVE DIAGNOSIS:   Same    PROCEDURE:  Simple vulvectomy, left     ANESTHESIA:    LMA     COMPLICATIONS:  None.     ESTIMATED BLOOD LOSS :   15 mL    IV FLUIDS:    700 mL LR    URINE OUTPUT:   Not recorded    FINDINGS:  Examination of her under anesthesia revealed an irregularly shaped left anterior vulvar/periclitoral lesion that was primarily erythematous with a slightly raised and hyperkeratotic focus just over 1 cm left lateral to the clitoris.  The hyperkeratotic focus approximated the clitoral edge and the flattened, erythematous beefy portion extended out to the left lateral anterior labia majora.  The lesion did not perceivably invade into the underlying subcutaneous tissues.  There were no additional vulvar lesions.  There were no lesions within the perianal region.  The urethra was within normal limits.  At the completion of surgery, the lesion was completely excised with a 1 cm margin circumferentially.     PROCEDURE IN DETAIL:  Latanya Triplett is a very pleasant 47 year old-year-old with a history of stage I anal carcinoma status post definitive surgical excision in 2017 who presented with a progressive left lateral vulvar lesion biopsy-proven to be consistent with at least GILBERTO 3 with adnexal extension following excision 10/3/2022.  Due to features on exam consistent with GILBERTO 3 as opposed to invasive carcinoma, the patient was brought to the operating room for the above-stated procedure.  One of the risks reviewed with the patient was the potential need for reexcision with deeper layers if there were to be an uncovered invasive carcinoma.  The planned procedure was reviewed with the patient in the preoperative area.  Consent was reviewed and signed in the presence of both parties. The patient was brought the operative suite where general anesthesia via LMA was found to be  adequate. She was prepared and draped in the normal sterile fashion in high lithotomy position. An exam under anesthesia revealed the findings as per above.     An ellipsoid incision was mapped out with a 1 cm margin.  The Bovie was utilized to create the skin incision and dissect the tissues to just below the basement membrane for excision. The lesion base was cauterized with the bovie.  The excision was evaluated for primary closure.  Deep, subcutaneous, interrupted sutures were placed to re-approximate the subcutaneous tissues in a vertical fashion.  The skin edges were reapproximated with a running subcuticular stitch of 3-0 Monocryl.  5% bupivicaine was placed (14 mL) at the completion of the procedure.  The specimen excised was roughly 1.5 x 2.5 cm.  The specimen was labeled as a left anterior vulvar lesion with a stitch at the periclitoral margin.    The patient tolerated the procedure very well. All sponge, lap and needle counts were correct x2 at the completion of the procedure. She was taken to the recovery room in a stable condition.       Clari Perez MD  Gynecologic Oncologist  [facility] Office  [phone]

## 2022-11-15 NOTE — ANESTHESIA PROCEDURE NOTES
Airway       Patient location during procedure: OR  Staff -        Anesthesiologist:  Bulmaro Duvall MD       CRNA: Bharti Jaimes APRN CRNA       Performed By: CRNA  Consent for Airway        Urgency: elective  Indications and Patient Condition       Indications for airway management: yoseph-procedural       Induction type:intravenous       Mask difficulty assessment: 0 - not attempted    Final Airway Details       Final airway type: supraglottic airway    Supraglottic Airway Details        Type: LMA       Brand: Ambu AuraGain       LMA size: 4    Post intubation assessment        Placement verified by: capnometry and chest rise        Number of attempts at approach: 1       Secured with: silk tape       Ease of procedure: easy       Dentition: Unchanged and Intact

## 2022-11-15 NOTE — ANESTHESIA CARE TRANSFER NOTE
Patient: Latanya Triplett    Procedure: Procedure(s):  SIMPLE VULVECTOMY - LEFT       Diagnosis: Vulvar cancer (H) [C51.9]  Diagnosis Additional Information: No value filed.    Anesthesia Type:   General     Note:    Oropharynx: oropharynx clear of all foreign objects and spontaneously breathing  Level of Consciousness: awake and drowsy  Oxygen Supplementation: face mask  Level of Supplemental Oxygen (L/min / FiO2): 5  Independent Airway: airway patency satisfactory and stable  Dentition: dentition unchanged  Vital Signs Stable: post-procedure vital signs reviewed and stable  Report to RN Given: handoff report given  Patient transferred to: PACU    Handoff Report: Identifed the Patient, Identified the Reponsible Provider, Reviewed the pertinent medical history, Discussed the surgical course, Reviewed Intra-OP anesthesia mangement and issues during anesthesia, Set expectations for post-procedure period and Allowed opportunity for questions and acknowledgement of understanding      Vitals:  Vitals Value Taken Time   /69 11/15/22 1133   Temp 98.0 F 11/15/22   1133   Pulse 75 11/15/22 1134   Resp 16 11/15/22 1134   SpO2 100 % 11/15/22 1134   Vitals shown include unvalidated device data.    Electronically Signed By: LAYO Rivas CRNA  November 15, 2022  11:36 AM

## 2022-11-17 LAB
PATH REPORT.COMMENTS IMP SPEC: ABNORMAL
PATH REPORT.COMMENTS IMP SPEC: ABNORMAL
PATH REPORT.COMMENTS IMP SPEC: YES
PATH REPORT.FINAL DX SPEC: ABNORMAL
PATH REPORT.GROSS SPEC: ABNORMAL
PATH REPORT.MICROSCOPIC SPEC OTHER STN: ABNORMAL
PATH REPORT.RELEVANT HX SPEC: ABNORMAL
PATHOLOGY SYNOPTIC REPORT: ABNORMAL
PHOTO IMAGE: ABNORMAL

## 2022-12-20 NOTE — PROGRESS NOTES
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 She presents today for a post-operative visit.    Clinical Course:  1/22/15: The patient was experiencing abnormal uterine bleeding and cramping. Her Pap smear was negative HPV. Pelvic US showed fibroids and possible polyp. EMB showed no evidence of malignancy, hyperplasia, or endometritis.   2/18/15: The patient underwent laparoscopic hysterectomy and bilateral salpingectomy. Pathology showed multiple intramural and subserosal leiomyoma.   7/3/17: She underwent EUA and excision of hypertrophic anal papilla. Pathology showed invasive well-differentiated squamous cell carcinoma.   She was treated with chemotherapy and radiation therapy for her stage I anal cancer in 2017.   18: CT AP showed no evidence of metastatic disease in the abdomen or pelvis. Hazy inflammatory stranding in the low pelvis favored to be on the basis of treatment related effect. Mild thickening of the urinary bladder wall, also favored to be treatment related effect, alternatively, could be on the basis of cystitis.   18: MEFV-gene analysis was negative.   20: CT AP showed no acute inflammatory process in the abdomen or pelvis. Rectum is largely decompressed. No definite rectal wall thickening or mass is identified. Tiny focus of intraluminal air in the bladder but no bladder wall thickening.   10/3/22: The patient had had a skin tag/lesion on the left side of her labia, initially noticed in  2017. It has changed in size and caused more irritation, redness, and itching over time. She underwent excision of the left labia lesion on 10/3/22. Pathology showed extensive high-grade squamous intraepithelial lesion (GILBERTO-3) with adnexal extension. Surgical margins were all positive for GILBERTO-3.   11/15/22: The patient underwent left simple vulvectomy. Pathology showed squamous cell carcinoma, stage IB, with surrounding extensive GILBERTO III. Closest 4 mm margin is at the 2-3 o'clock region. Deep margin is 6 mm. No LVSI.  Genetic Testing (Gyn Oncology):  Negative MEFV-gene    Interval History (Gyn Oncology)  The patient returns to the clinic for an ongoing follow-up and a post-operative visit. Since her last visit, she states she is doing well. She underwent left simple vulvectomy on 11/15/22. She has been recovering well from the procedure. She denies bleeding from the wound. However, the patient complains of swelling and stabbing pain along the left vulva, but this has improved from prior. She mentions that she was started on antibiotics with benefits. Her numbness to the tissue surrounding her incision has also improved following that. She has been taking ibuprofen and Tylenol sparingly. She denies any abnormal vaginal bleeding or pelvic pain. She denies associated fever, nausea, vomiting, shortness of breath, chest pain, cough, night sweats, and chills. She has no abdominal pain and no new changes in her bladder or bowel habits. She denies dysuria, hematuria, melena, or bright red blood per rectum. She has no abnormal lower extremity edema. She denies peripheral neuropathy.     Review of Systems:  A complete 14-point review of systems is negative except as noted in the above history of present illness.    Past Medical History:  Anal cancer   Mixed hyperlipidemia   Epigastric pain  Anxiety   Migraines  PID  Epilepsy    Surgical History:  Removal of single anal tag     EUA  Laparoscopic hysterectomy    Bilateral salpingectomy  Left vulvar lesion excision 10/3/22  Left simple vulvectomy 11/15/22    OB/Gyn History:  .  x2,  x1. Menarche at age 10. LMP: 2/5/15.     Allergies:  Tamiflu  Medications:  Amoxicillin-Clavulanate Oral 875 mg-125 mg 875-125 mg tablet 1 tab orally 2 times per day. Take with food x 10 days  Calcium Carbonate Oral 600 mg calcium (1,500 mg) tablet  Clotrimazole Topical Cream 1 % 1 % cream 1 application topically 2 times per day.  Multivitamins Oral Tablet  Silvadene (Silver Sulfadiazine Topical Cream 1 %) 1 % cream 1 application topically 2 times per day. apply a 1.5 mm thickness.  Atorvastatin Oral 10 mg tablet  Family History:  Mother: diabetes  Father: HTN, prostate cancer   MGF: cancer     Social History:  . Lives with  and 4 children. Works part time as caregiver for elderly. Currently every day smoker; smokes half a pack of cigarettes daily. Never used tobacco or illicit drugs. Former vape user. Does not consume alcohol.     Health Maintenance:    Vital Signs:  Blood pressure: 123/68, Pulse: 84, Temperature: 97.7 F, Respirations: 16, O2 sat: 98%, Pain Scale: 0, Height: 66.5 in, Weight: 171 lb, BSA: 1.88, BMI: 27.19 kg/m2    Physical Exam (Gyn Oncology):  General: Alert and oriented x3, no acute distress  Skin: Clean, dry and intact.  HEENT: Normocephalic, atraumatic  Lymph: No cervical, supraclavicular, inguinal, or femoral adenopathy.  CV: Regular rate and rhythm, no murmurs, rubs or gallops  Resp: Clear to auscultation bilaterally, no wheezes, rales or rhonchi  Abdomen: Soft, non-tender to palpation, no rebound or guarding  Lower Extremity Exam: No lower extremity edema, no palpable cords  Neuro: Cranial Nerves 2-12 intact, gross motor skills intact  Genitourinary exam: Examination of the bilateral vulva reveals that prior left anterior excision. It is healing very well without any sign of erythema. There is adequate distance to the clitoris where our  margin was previously 7 mm. There is slight epithelial dehiscence of roughly 2 mm.     Laboratory Data:    Imagin18: CT AP  IMPRESSION:  1. No evidence of metastatic disease in the abdomen or pelvis.   2. Hazy inflammatory stranding in the low pelvis favored to be on the basis of treatment related effect.   3. Mild thickening of the urinary bladder wall, also favored to be treatment related effect, alternatively, could be on the basis of cystitis.     20: CT AP  IMPRESSION:  1. No acute inflammatory process in the abdomen or pelvis.   2. Rectum is largely decompressed. No definite rectal wall thickening or mass is identified.   3. Tiny focus of intraluminal air in the bladder but no bladder wall thickening.     PATHOLOGY:  FINAL DIAGNOSIS  A) VULVA, LEFT LABIA, 2 O'CLOCK LATERAL TO LESION, BX: 10/3/22  1. Extensive high-grade squamous intraepithelial lesion (GILBERTO-3) with adnexal extension.  2. Focus suspicious for but not diagnostic of superficially invasive squamous cell carcinoma.     B) VULVA, LEFT LABIA, 2 O'CLOCK LESION, EXCISION: 10/3/22  1. Extensive high-grade squamous intraepithelial lesion (GILBERTO-3) with adnexal extension.  2.  Surgical margins were all positive for GILBERTO-3.     PATHOLOGY  11/15/22: Left Simple Vulvectomy  FINAL DIAGNOSIS  LEFT VULVA, SIMPLE VULVECTOMY:  1. INVASIVE MODERATELY DIFFERENTIATED BASALOID SQUAMOUS CELL CARCINOMA (SEE SYNOPTIC REPORT BELOW)  A. SIZE: 22 X 12 X 2 MM  B. DEPTH OF INVASION: 2 MM  C. INVASIVE TUMOR 4 MM FROM INKED 2-3:00 MARGIN, 7 MM FROM 9-11:00 MARGIN AND 6 MM FROM DEEP  MARGIN  D. CENTRAL ULCERATION  E. STAGE: pT1b; FIGO STAGE:IB  2. EXTENSIVE GILBERTO II] WITH FOCAL OVERLYING POLYPOID AND FLAT CONDYLOMA  A. GILBERTO Ill APPROXIMATELY 28 X 21 MM  B. GILBERTO IIl4 MM FROM 1-3:00 MARGIN AND 7 MM FROM 9-11:00 MARGIN  3. 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 She presents today for a post-operative visit.  SCC left anterior vulva: Status post wide local excision for GILBERTO-3 with identification of invasive squamous cell basaloid carcinoma of the left anterior vulva. At this point, we need to proceed with a repeat excision to obtain at least an 8-10 mm margin to decrease overall risk of recurrence. We additionally discussed injection of the wound for potential sentinel lymph node injection and biopsies. Because of the relative underlying nature of the lesion, we will need to consider bilateral injection and biopsies. I reviewed the underlying procedure with the patient in detail. This includes a modified radical vulvectomy with excision of the prior scar and bilateral sentinel lymph node injection and biopsy. I discussed with Cathryn that she will present to nuclear medicine prior to the procedure to have her injection by myself and then will undergo lymphoscintigraphy. We reviewed that if mapping fails, we may need to proceed with a more extensive lymphadenectomy. We reviewed the risks of surgery, which include bleeding, infection, and potential breakdown. Because of the recent post-operative infection, I would like to proceed with immediate post-operative antibiotics to decrease the risk of a recurrent infection. We will schedule her procedure as soon as reasonable. Cathryn will need an updated H&P. Due to the recent diagnosis of cancer, we will obtain a PET/CT to rule out metastatic disease. This has been ordered.   Vulvar candidiasis: Not currently an issue. Rx for clotrimazole cream  was sent to patient's pharmacy today.  Recommend she keep area clean and dry.  Tobacco dependence: Not addressed today. I discussed with Latanya and her  the risk of recurrence and progression of disease and relationship to not only her current vulgar process but also her history of anal cancer with persistent smoking. We discussed an underlying component to an infection with HPV and also discussed the correlation between smoking and progression of disease and increased risk of recurrence. Latanya was surprised to hear that her smoking could contribute to development of her cancer. She was advised also to stop smoking as soon as reasonable.  History of stage I squamous cell carcinoma of the anus: Not discussed. Status post chemotherapy sensitizing pelvic radiation. Due to the history of pelvic radiation, Latanya is likely to struggle with issues related to wound healing. I have strongly advised that she discontinue smoking prior to surgery in order to optimize wound healing to the best of her ability.  HealthCare Maintenance: Continue routine healthcare follow up with her primary care provider.   Thank you very much for allowing me to take part in the care of this very sweet patient.    3 minutes in reviewing records, 16 minutes in discussion and exam, 2 minutes ordering labs and sending prescriptions.     Pain Care Management:  Pain Scale: 0    Patient Care needs:  Depressions Status: Was screened; Outcome positive: No; Screening Date: 12/16/2022; Screening Tool: PRIME MD-PHQ2; Total depression score: 0  Psycho-Social PHQ-9 Follow-up Plan (if applicable):  Smoking Status: Current every day smoker  Documentation assistance provided by kitty Loveibing for Dr. Clari Gamez, on 12/16/2022. The patient and family/friends if present were apprised of the use of a scribe through remote viewing and all parties consented to conducting the visit in this manner.    Clari Perez,  MD  Phone: [phone]   Fax: [phone]

## 2022-12-29 RX ORDER — MULTIVITAMIN,THERAPEUTIC
1 TABLET ORAL DAILY
COMMUNITY

## 2023-01-02 ENCOUNTER — ANESTHESIA EVENT (OUTPATIENT)
Dept: SURGERY | Facility: HOSPITAL | Age: 48
End: 2023-01-02
Payer: COMMERCIAL

## 2023-01-03 ENCOUNTER — HOSPITAL ENCOUNTER (OUTPATIENT)
Facility: HOSPITAL | Age: 48
Discharge: HOME | End: 2023-01-03
Attending: OBSTETRICS & GYNECOLOGY | Admitting: OBSTETRICS & GYNECOLOGY
Payer: COMMERCIAL

## 2023-01-03 ENCOUNTER — ANESTHESIA (OUTPATIENT)
Dept: SURGERY | Facility: HOSPITAL | Age: 48
End: 2023-01-03
Payer: COMMERCIAL

## 2023-01-03 ENCOUNTER — HOSPITAL ENCOUNTER (OUTPATIENT)
Dept: NUCLEAR MEDICINE | Facility: HOSPITAL | Age: 48
Discharge: HOME | End: 2023-01-03
Attending: OBSTETRICS & GYNECOLOGY
Payer: COMMERCIAL

## 2023-01-03 VITALS
BODY MASS INDEX: 26.84 KG/M2 | TEMPERATURE: 97.8 F | OXYGEN SATURATION: 97 % | RESPIRATION RATE: 18 BRPM | HEIGHT: 67 IN | SYSTOLIC BLOOD PRESSURE: 132 MMHG | DIASTOLIC BLOOD PRESSURE: 74 MMHG | WEIGHT: 171 LBS | HEART RATE: 74 BPM

## 2023-01-03 DIAGNOSIS — G89.18 POSTOPERATIVE PAIN: ICD-10-CM

## 2023-01-03 DIAGNOSIS — C51.9 CANCER OF VULVA (H): ICD-10-CM

## 2023-01-03 DIAGNOSIS — C51.9 VULVAR CANCER (H): Primary | ICD-10-CM

## 2023-01-03 PROCEDURE — 88342 IMHCHEM/IMCYTCHM 1ST ANTB: CPT | Mod: 26 | Performed by: PATHOLOGY

## 2023-01-03 PROCEDURE — 710N000009 HC RECOVERY PHASE 1, LEVEL 1, PER MIN: Performed by: OBSTETRICS & GYNECOLOGY

## 2023-01-03 PROCEDURE — 88309 TISSUE EXAM BY PATHOLOGIST: CPT | Mod: 26 | Performed by: PATHOLOGY

## 2023-01-03 PROCEDURE — 370N000017 HC ANESTHESIA TECHNICAL FEE, PER MIN: Performed by: OBSTETRICS & GYNECOLOGY

## 2023-01-03 PROCEDURE — 88305 TISSUE EXAM BY PATHOLOGIST: CPT | Mod: 26 | Performed by: PATHOLOGY

## 2023-01-03 PROCEDURE — 88307 TISSUE EXAM BY PATHOLOGIST: CPT | Mod: TC | Performed by: OBSTETRICS & GYNECOLOGY

## 2023-01-03 PROCEDURE — 999N000141 HC STATISTIC PRE-PROCEDURE NURSING ASSESSMENT: Performed by: OBSTETRICS & GYNECOLOGY

## 2023-01-03 PROCEDURE — 343N000001 HC RX 343: Performed by: OBSTETRICS & GYNECOLOGY

## 2023-01-03 PROCEDURE — 250N000011 HC RX IP 250 OP 636: Performed by: NURSE ANESTHETIST, CERTIFIED REGISTERED

## 2023-01-03 PROCEDURE — 250N000009 HC RX 250: Performed by: OBSTETRICS & GYNECOLOGY

## 2023-01-03 PROCEDURE — 88307 TISSUE EXAM BY PATHOLOGIST: CPT | Mod: 26 | Performed by: PATHOLOGY

## 2023-01-03 PROCEDURE — 710N000012 HC RECOVERY PHASE 2, PER MINUTE: Performed by: OBSTETRICS & GYNECOLOGY

## 2023-01-03 PROCEDURE — 250N000011 HC RX IP 250 OP 636: Performed by: PHYSICIAN ASSISTANT

## 2023-01-03 PROCEDURE — A9520 TC99 TILMANOCEPT DIAG 0.5MCI: HCPCS | Performed by: OBSTETRICS & GYNECOLOGY

## 2023-01-03 PROCEDURE — 250N000011 HC RX IP 250 OP 636: Performed by: ANESTHESIOLOGY

## 2023-01-03 PROCEDURE — 250N000009 HC RX 250: Performed by: NURSE ANESTHETIST, CERTIFIED REGISTERED

## 2023-01-03 PROCEDURE — 78195 LYMPH SYSTEM IMAGING: CPT

## 2023-01-03 PROCEDURE — 272N000001 HC OR GENERAL SUPPLY STERILE: Performed by: OBSTETRICS & GYNECOLOGY

## 2023-01-03 PROCEDURE — 360N000077 HC SURGERY LEVEL 4, PER MIN: Performed by: OBSTETRICS & GYNECOLOGY

## 2023-01-03 PROCEDURE — 258N000003 HC RX IP 258 OP 636: Performed by: ANESTHESIOLOGY

## 2023-01-03 PROCEDURE — 250N000011 HC RX IP 250 OP 636: Performed by: OBSTETRICS & GYNECOLOGY

## 2023-01-03 RX ORDER — NALOXONE HYDROCHLORIDE 0.4 MG/ML
0.2 INJECTION, SOLUTION INTRAMUSCULAR; INTRAVENOUS; SUBCUTANEOUS
Status: DISCONTINUED | OUTPATIENT
Start: 2023-01-03 | End: 2023-01-03 | Stop reason: HOSPADM

## 2023-01-03 RX ORDER — CEFAZOLIN SODIUM/WATER 2 G/20 ML
2 SYRINGE (ML) INTRAVENOUS
Status: COMPLETED | OUTPATIENT
Start: 2023-01-03 | End: 2023-01-03

## 2023-01-03 RX ORDER — FENTANYL CITRATE 50 UG/ML
25 INJECTION, SOLUTION INTRAMUSCULAR; INTRAVENOUS EVERY 5 MIN PRN
Status: DISCONTINUED | OUTPATIENT
Start: 2023-01-03 | End: 2023-01-03 | Stop reason: HOSPADM

## 2023-01-03 RX ORDER — LIDOCAINE 40 MG/G
CREAM TOPICAL
Status: DISCONTINUED | OUTPATIENT
Start: 2023-01-03 | End: 2023-01-03 | Stop reason: HOSPADM

## 2023-01-03 RX ORDER — FENTANYL CITRATE 50 UG/ML
INJECTION, SOLUTION INTRAMUSCULAR; INTRAVENOUS PRN
Status: DISCONTINUED | OUTPATIENT
Start: 2023-01-03 | End: 2023-01-03

## 2023-01-03 RX ORDER — HYDROXYZINE HYDROCHLORIDE 25 MG/1
25 TABLET, FILM COATED ORAL
Status: DISCONTINUED | OUTPATIENT
Start: 2023-01-03 | End: 2023-01-03 | Stop reason: HOSPADM

## 2023-01-03 RX ORDER — CEFAZOLIN SODIUM/WATER 2 G/20 ML
2 SYRINGE (ML) INTRAVENOUS SEE ADMIN INSTRUCTIONS
Status: DISCONTINUED | OUTPATIENT
Start: 2023-01-03 | End: 2023-01-03 | Stop reason: HOSPADM

## 2023-01-03 RX ORDER — ONDANSETRON 2 MG/ML
4 INJECTION INTRAMUSCULAR; INTRAVENOUS EVERY 30 MIN PRN
Status: DISCONTINUED | OUTPATIENT
Start: 2023-01-03 | End: 2023-01-03 | Stop reason: HOSPADM

## 2023-01-03 RX ORDER — HYDROMORPHONE HYDROCHLORIDE 1 MG/ML
0.2 INJECTION, SOLUTION INTRAMUSCULAR; INTRAVENOUS; SUBCUTANEOUS EVERY 5 MIN PRN
Status: DISCONTINUED | OUTPATIENT
Start: 2023-01-03 | End: 2023-01-03 | Stop reason: HOSPADM

## 2023-01-03 RX ORDER — FENTANYL CITRATE 50 UG/ML
50 INJECTION, SOLUTION INTRAMUSCULAR; INTRAVENOUS EVERY 5 MIN PRN
Status: DISCONTINUED | OUTPATIENT
Start: 2023-01-03 | End: 2023-01-03 | Stop reason: HOSPADM

## 2023-01-03 RX ORDER — IBUPROFEN 600 MG/1
600 TABLET, FILM COATED ORAL EVERY 6 HOURS PRN
Qty: 20 TABLET | Refills: 0 | Status: SHIPPED | OUTPATIENT
Start: 2023-01-03

## 2023-01-03 RX ORDER — SODIUM CHLORIDE, SODIUM LACTATE, POTASSIUM CHLORIDE, CALCIUM CHLORIDE 600; 310; 30; 20 MG/100ML; MG/100ML; MG/100ML; MG/100ML
INJECTION, SOLUTION INTRAVENOUS CONTINUOUS
Status: DISCONTINUED | OUTPATIENT
Start: 2023-01-03 | End: 2023-01-03 | Stop reason: HOSPADM

## 2023-01-03 RX ORDER — NALOXONE HYDROCHLORIDE 0.4 MG/ML
0.4 INJECTION, SOLUTION INTRAMUSCULAR; INTRAVENOUS; SUBCUTANEOUS
Status: DISCONTINUED | OUTPATIENT
Start: 2023-01-03 | End: 2023-01-03 | Stop reason: HOSPADM

## 2023-01-03 RX ORDER — PROPOFOL 10 MG/ML
INJECTION, EMULSION INTRAVENOUS CONTINUOUS PRN
Status: DISCONTINUED | OUTPATIENT
Start: 2023-01-03 | End: 2023-01-03

## 2023-01-03 RX ORDER — ACETAMINOPHEN 325 MG/1
975 TABLET ORAL EVERY 6 HOURS PRN
Status: DISCONTINUED | OUTPATIENT
Start: 2023-01-03 | End: 2023-01-03 | Stop reason: HOSPADM

## 2023-01-03 RX ORDER — DEXAMETHASONE SODIUM PHOSPHATE 4 MG/ML
INJECTION, SOLUTION INTRA-ARTICULAR; INTRALESIONAL; INTRAMUSCULAR; INTRAVENOUS; SOFT TISSUE PRN
Status: DISCONTINUED | OUTPATIENT
Start: 2023-01-03 | End: 2023-01-03

## 2023-01-03 RX ORDER — LIDOCAINE HYDROCHLORIDE 10 MG/ML
INJECTION, SOLUTION INFILTRATION; PERINEURAL PRN
Status: DISCONTINUED | OUTPATIENT
Start: 2023-01-03 | End: 2023-01-03

## 2023-01-03 RX ORDER — OXYCODONE HYDROCHLORIDE 5 MG/1
5-10 TABLET ORAL EVERY 4 HOURS PRN
Qty: 15 TABLET | Refills: 0 | Status: SHIPPED | OUTPATIENT
Start: 2023-01-03 | End: 2023-01-06

## 2023-01-03 RX ORDER — IBUPROFEN 200 MG
600 TABLET ORAL EVERY 6 HOURS PRN
Status: DISCONTINUED | OUTPATIENT
Start: 2023-01-03 | End: 2023-01-03 | Stop reason: HOSPADM

## 2023-01-03 RX ORDER — SILVER SULFADIAZINE 10 MG/G
CREAM TOPICAL DAILY
Status: DISCONTINUED | OUTPATIENT
Start: 2023-01-03 | End: 2023-01-03 | Stop reason: HOSPADM

## 2023-01-03 RX ORDER — FENTANYL CITRATE 50 UG/ML
25 INJECTION, SOLUTION INTRAMUSCULAR; INTRAVENOUS
Status: DISCONTINUED | OUTPATIENT
Start: 2023-01-03 | End: 2023-01-03 | Stop reason: HOSPADM

## 2023-01-03 RX ORDER — HYDROMORPHONE HYDROCHLORIDE 1 MG/ML
0.4 INJECTION, SOLUTION INTRAMUSCULAR; INTRAVENOUS; SUBCUTANEOUS EVERY 5 MIN PRN
Status: DISCONTINUED | OUTPATIENT
Start: 2023-01-03 | End: 2023-01-03 | Stop reason: HOSPADM

## 2023-01-03 RX ORDER — ONDANSETRON 4 MG/1
4 TABLET, ORALLY DISINTEGRATING ORAL EVERY 30 MIN PRN
Status: DISCONTINUED | OUTPATIENT
Start: 2023-01-03 | End: 2023-01-03 | Stop reason: HOSPADM

## 2023-01-03 RX ORDER — PROPOFOL 10 MG/ML
INJECTION, EMULSION INTRAVENOUS PRN
Status: DISCONTINUED | OUTPATIENT
Start: 2023-01-03 | End: 2023-01-03

## 2023-01-03 RX ORDER — MEPERIDINE HYDROCHLORIDE 25 MG/ML
12.5 INJECTION INTRAMUSCULAR; INTRAVENOUS; SUBCUTANEOUS
Status: DISCONTINUED | OUTPATIENT
Start: 2023-01-03 | End: 2023-01-03 | Stop reason: HOSPADM

## 2023-01-03 RX ORDER — KETAMINE HYDROCHLORIDE 10 MG/ML
INJECTION INTRAMUSCULAR; INTRAVENOUS PRN
Status: DISCONTINUED | OUTPATIENT
Start: 2023-01-03 | End: 2023-01-03

## 2023-01-03 RX ORDER — OXYCODONE HYDROCHLORIDE 5 MG/1
5-10 TABLET ORAL EVERY 4 HOURS PRN
Status: DISCONTINUED | OUTPATIENT
Start: 2023-01-03 | End: 2023-01-03 | Stop reason: HOSPADM

## 2023-01-03 RX ORDER — MAGNESIUM SULFATE 4 G/50ML
4 INJECTION INTRAVENOUS ONCE
Status: COMPLETED | OUTPATIENT
Start: 2023-01-03 | End: 2023-01-03

## 2023-01-03 RX ORDER — AMOXICILLIN 250 MG
1 CAPSULE ORAL 2 TIMES DAILY PRN
COMMUNITY
Start: 2023-01-03

## 2023-01-03 RX ORDER — SILVER SULFADIAZINE 10 MG/G
CREAM TOPICAL PRN
Status: DISCONTINUED | OUTPATIENT
Start: 2023-01-03 | End: 2023-01-03 | Stop reason: HOSPADM

## 2023-01-03 RX ORDER — BUPIVACAINE HYDROCHLORIDE 2.5 MG/ML
INJECTION, SOLUTION INFILTRATION; PERINEURAL PRN
Status: DISCONTINUED | OUTPATIENT
Start: 2023-01-03 | End: 2023-01-03 | Stop reason: HOSPADM

## 2023-01-03 RX ADMIN — MIDAZOLAM 2 MG: 1 INJECTION INTRAMUSCULAR; INTRAVENOUS at 14:25

## 2023-01-03 RX ADMIN — KETAMINE HYDROCHLORIDE 10 MG: 10 INJECTION, SOLUTION INTRAMUSCULAR; INTRAVENOUS at 15:53

## 2023-01-03 RX ADMIN — FENTANYL CITRATE 50 MCG: 50 INJECTION, SOLUTION INTRAMUSCULAR; INTRAVENOUS at 14:56

## 2023-01-03 RX ADMIN — HYDROMORPHONE HYDROCHLORIDE 0.5 MG: 1 INJECTION, SOLUTION INTRAMUSCULAR; INTRAVENOUS; SUBCUTANEOUS at 15:59

## 2023-01-03 RX ADMIN — FENTANYL CITRATE 50 MCG: 50 INJECTION, SOLUTION INTRAMUSCULAR; INTRAVENOUS at 17:15

## 2023-01-03 RX ADMIN — LIDOCAINE HYDROCHLORIDE 50 MG: 10 INJECTION, SOLUTION INFILTRATION; PERINEURAL at 14:36

## 2023-01-03 RX ADMIN — SODIUM CHLORIDE, POTASSIUM CHLORIDE, SODIUM LACTATE AND CALCIUM CHLORIDE: 600; 310; 30; 20 INJECTION, SOLUTION INTRAVENOUS at 13:33

## 2023-01-03 RX ADMIN — KETAMINE HYDROCHLORIDE 20 MG: 10 INJECTION, SOLUTION INTRAMUSCULAR; INTRAVENOUS at 15:12

## 2023-01-03 RX ADMIN — PROPOFOL 250 MCG/KG/MIN: 10 INJECTION, EMULSION INTRAVENOUS at 14:40

## 2023-01-03 RX ADMIN — PROPOFOL 200 MG: 10 INJECTION, EMULSION INTRAVENOUS at 14:36

## 2023-01-03 RX ADMIN — Medication 2 G: at 14:45

## 2023-01-03 RX ADMIN — TILMANOCEPT 1.08 MILLICURIE: KIT at 12:25

## 2023-01-03 RX ADMIN — FENTANYL CITRATE 50 MCG: 50 INJECTION, SOLUTION INTRAMUSCULAR; INTRAVENOUS at 14:36

## 2023-01-03 RX ADMIN — KETAMINE HYDROCHLORIDE 20 MG: 10 INJECTION, SOLUTION INTRAMUSCULAR; INTRAVENOUS at 14:36

## 2023-01-03 RX ADMIN — MAGNESIUM SULFATE HEPTAHYDRATE 4 G: 80 INJECTION, SOLUTION INTRAVENOUS at 13:33

## 2023-01-03 RX ADMIN — DEXAMETHASONE SODIUM PHOSPHATE 10 MG: 4 INJECTION, SOLUTION INTRA-ARTICULAR; INTRALESIONAL; INTRAMUSCULAR; INTRAVENOUS; SOFT TISSUE at 14:55

## 2023-01-03 ASSESSMENT — ACTIVITIES OF DAILY LIVING (ADL)
ADLS_ACUITY_SCORE: 20

## 2023-01-03 NOTE — INTERVAL H&P NOTE
I have seen and examined the patient. There are no updates or changes to the preoperative history and physical.    Clari Perez MD  Gynecologic Oncology  [facility]  [facility]: [phone]

## 2023-01-03 NOTE — PROCEDURES
POST OPERATIVE NOTE  Preoperative Diagnosis:  Vulvar cancer (H) [C51.9]    Postoperative Diagnosis:  Same    CLINICAL STAGE: T: 1B N: X M: 0      NATIONAL GUIDELINE REFERENCED FOR TREATMENT PLANNING:NCCN    Procedures:  Left modified radical vulvectomy, left inguinofemoral sentinel lymph node biopsy    Prosthetic Devices:  None    Surgeon(s) and Assistants (if any):  Surgeon(s):  Clari Blum MD Barkman, Alyssa Jean, PA-C  Circulator: Adrianna Null RN; Jayesh Gonzalez RN  Scrub Person: Myrtle Tipton; Carolyn Sommer    Anesthesia:  General    Drains:  None    EBL: 30 mL    Specimens: Left sentinel inguinofemoral lymph node, left anterior vulva -stitch at clitoral margin, left inferior margin    Complications: none    Findings/Conclusions: Left periclitoral anterior vulvar incision well-healed roughly 3 mm from the clitoris and involving the left lateral aspect of the clitoral jang.  Technetium hot left inguinofemoral lymph node without obvious blue dye easily identified with guidance from preoperative lymphoscintigraphy.  There were no findings suspicious for residual disease.    Latanya Triplett is a very pleasant 47-year-old female with a recent diagnosis of stage Ib invasive moderately differentiated squamous cell carcinoma of the left anterior vulva measuring 2.2 x 1.2 cm.  Depth of invasion was 2 mm with proximal margins involving the 9-11 o'clock margin and the 2-3 o'clock margin and lastly the deep margin.  The patient was counseled as to the planned procedure involving a reexcision to obtain negative margins.  Patient has a history of anal carcinoma and is status post pelvic radiation.  The risks, benefits and alternatives were reviewed in the preoperative area in detail.  The consent was signed.  Due to the well lateralized lymphoscintigraphy, the bilateral sentinel lymph node injection and biopsy was modified to a left sentinel lymph node injection and biopsy.  The patient first  "presented to nuclear medicine and underwent injection with technetium-99. She then underwent lymphoscintigraphy as per above.  She was subsequently brought to the preoperative area where the consent was signed and reviewed.  The patient was subsequently brought to the operative suite where general LMA anesthesia was found to be adequate. She was prepared and draped in the normal sterile fashion in high lithotomy position. Prior to prepping, 1 mL of 1.25 mg/mL methylene blue was injected intradermally at 11, 1, 5 and 7:00. An additional 1.5 ml was injected at 3 o'clock, respectively along the left lateral aspect of the lesion. An exam under anesthesia revealed the findings as per above.     Attention was then turned to the left inguinofemoral kirti mapping and sentinel lymph node identification. The Norma counter was used to map out the sentinel lymph nodes which had already been marked by lymphoscintigraphy.  The neoprobe was sufficient in identifying the sentinel lymph nodes within the previously marked space.  Typical landmarks for mapping were made at the left lateral aspect of the pubic tubercle, the anterior superior iliac spine and that the femoral artery.  The typical incision was mapped out with a marker and subsequently, on the left over the site of the lymphoscintigraphy marking, a 2 cm skin incision was created with the knife, which was later expanded to 3 cm. The subcutaneous fatty layer was dissected deep to emerson's layer.  There was no obvious methylene blue dye.  The neoprobe was used to direct further dissection which was immediately posterior to the cribriform fascia.  This identified the \" hot\" lymph node which was then dissected just below cribriform's fascia.  There was no substantial bleeding with the dissection.  On removing the lymph node which was palpable and less than 1 cm, the lymph node remained hot with the neoprobe.  There was no further blue dye identified within the dissection " space.  Following, the subcutaneous tissues were re-approximated with 2-0 vicryl in a vertical mattress and the skin re-approximated with 3-0 monocryl.  An overlying layer of Dermabond was placed.  Attention was then turned to the vulva.    An ellipsoid incision was mapped out with a 1 cm margin to the prior incision. The knife was used to incise the dermal layer and the most superficial subcutaneous tissues. The bovie was then used to dissect the subcutaneous tissues deep to the fascial layer.  The Ligasure sealing device was also used for hemostasis in the area. The perineal and deep clitoral vessels were coagulated. The urogenital diaphragm was reached and the base of the excision then fully transected with the handheld ligasure device.  The clitoris was preserved, however the overlying epithelium was resected.  A stitch was placed at the clitoral margin for orientation. The defect was inspected for closure. The deep tissues again were re-approximated with deep vertical mattress sutures of 2-0 vicryl. The skin edges were then closed with multiple, interrupted sutures of 3-0 vicryl.  A dog tag was noted at the inferior aspect due to induration from the prior incision.  This was excised at the inferior margin and sent as the left inferior margin.  The remainder of the incision was reapproximated with interrupted 3-0 Vicryl sutures.    At the completion, all sites were injected with 0.25% marcaine without epinephrine. The left inguinofemoral incision was covered with a layer of dermabond. All incision sites were hemostatic and a fluff placed over the vulvar incision. All sponge, lap and needle counts were correct x2 at the completion of the procedure.     Shantel Michelle PA-C assisted me throughout all portions of the case and was vital in it's completion. She assisted with retraction, adequate visualization, and closure.     Clari Perez MD  Gynecologic Oncologist  [facility]  Office  [phone]

## 2023-01-03 NOTE — ANESTHESIA PREPROCEDURE EVALUATION
Anesthesia Pre-Procedure Evaluation    Patient: Latanya Triplett   MRN: [id number] : 1975        Procedure : Procedure(s):  MODIFIED LEFT RADICAL VULVECTOMY, BILATERAL SENTINEL LYMPH NODE INJECTION AND BIOPSY, LYMPHOSCINTIGRAPHY NUCLEAR MEDICINE INJECTION          Past Medical History:   Diagnosis Date     Malignant neoplasm of anal canal (H)      Mixed hyperlipidemia      Squamous cell carcinoma of vulva (H)       Past Surgical History:   Procedure Laterality Date     ARTHROSCOPY KNEE RT/LT Left       SECTION       GYN SURGERY       LAPAROSCOPIC HYSTERECTOMY       LAPAROSCOPY DIAGNOSTIC (GENERAL)       REMOVAL OF ANAL TAG   2017     SALPINGECTOMY Bilateral      VULVECTOMY SIMPLE Left 11/15/2022    Procedure: SIMPLE VULVECTOMY - LEFT;  Surgeon: Clari Blum MD;  Location: [facility] OR      Allergies   Allergen Reactions     Tamiflu [Oseltamivir] Rash      Social History     Tobacco Use     Smoking status: Every Day     Packs/day: 0.08     Years: 20.00     Pack years: 1.60     Types: Cigarettes     Smokeless tobacco: Former     Tobacco comments:     Vape   Substance Use Topics     Alcohol use: Not Currently      Wt Readings from Last 1 Encounters:   23 77.6 kg (171 lb)        Anesthesia Evaluation   Pt has had prior anesthetic.     No history of anesthetic complications       ROS/MED HX  ENT/Pulmonary:  - neg pulmonary ROS     Neurologic:  - neg neurologic ROS     Cardiovascular:     (+) Dyslipidemia -----    METS/Exercise Tolerance: >4 METS    Hematologic:  - neg hematologic  ROS     Musculoskeletal:  - neg musculoskeletal ROS     GI/Hepatic:  - neg GI/hepatic ROS     Renal/Genitourinary:  - neg Renal ROS     Endo:  - neg endo ROS     Psychiatric/Substance Use:  - neg psychiatric ROS     Infectious Disease:  - neg infectious disease ROS     Malignancy:   (+) Malignancy,     Other:  - neg other ROS          Physical Exam    Airway  airway exam normal      Mallampati: II    TM distance: > 3 FB   Neck ROM: full   Mouth opening: > 3 cm    Respiratory Devices and Support         Dental       (+) chipped      Cardiovascular   cardiovascular exam normal          Pulmonary   pulmonary exam normal                OUTSIDE LABS:  CBC: No results found for: WBC, HGB, HCT, PLT  BMP: No results found for: NA, POTASSIUM, CHLORIDE, CO2, BUN, CR, GLC  COAGS: No results found for: PTT, INR, FIBR  POC: No results found for: BGM, HCG, HCGS  HEPATIC: No results found for: ALBUMIN, PROTTOTAL, ALT, AST, GGT, ALKPHOS, BILITOTAL, BILIDIRECT, CHINMAY  OTHER: No results found for: PH, LACT, A1C, NELIA, PHOS, MAG, LIPASE, AMYLASE, TSH, T4, T3, CRP, SED    Anesthesia Plan    ASA Status:  2   NPO Status:  NPO Appropriate    Anesthesia Type: General.     - Airway: LMA   Induction: Intravenous, Propofol.   Maintenance: Balanced.        Consents    Anesthesia Plan(s) and associated risks, benefits, and realistic alternatives discussed. Questions answered and patient/representative(s) expressed understanding.    - Discussed:     - Discussed with:  Patient      - Extended Intubation/Ventilatory Support Discussed: No.      - Patient is DNR/DNI Status: No    Use of blood products discussed: No .     Postoperative Care    Pain management: IV analgesics, Multi-modal analgesia.   PONV prophylaxis: Ondansetron (or other 5HT-3), Dexamethasone or Solumedrol, Droperidol or Haldol     Comments:    Other Comments: 40 mg ketamine IV on induction.            Deshaun Roy MD

## 2023-01-03 NOTE — ANESTHESIA PROCEDURE NOTES
Airway       Patient location during procedure: OR  Staff -        Resident/Fellow: Norm Ray       CRNA: Oneal Ascencio APRN CRNA       Performed By: CRNA and SRNA  Consent for Airway        Urgency: elective  Indications and Patient Condition       Indications for airway management: yoseph-procedural       Induction type:intravenous       Mask difficulty assessment: 1 - vent by mask    Final Airway Details       Final airway type: supraglottic airway    Supraglottic Airway Details        Type: LMA       Brand: LMA Unique       LMA size: 4    Post intubation assessment        Placement verified by: capnometry, equal breath sounds and chest rise        Number of attempts at approach: 1       Number of other approaches attempted: 0       Secured with: silk tape       Ease of procedure: easy       Dentition: Intact and Unchanged

## 2023-01-03 NOTE — ANESTHESIA CARE TRANSFER NOTE
Patient: Latanya Triplett    Procedure: Procedure(s):  MODIFIED LEFT RADICAL VULVECTOMY, LEFT SENTINEL LYMPH NODE INJECTION AND BIOPSY, LYMPHOSCINTIGRAPHY NUCLEAR MEDICINE INJECTION       Diagnosis: Vulvar cancer (H) [C51.9]  Diagnosis Additional Information: No value filed.    Anesthesia Type:   General     Note:    Oropharynx: oropharynx clear of all foreign objects  Level of Consciousness: drowsy  Oxygen Supplementation: face mask  Level of Supplemental Oxygen (L/min / FiO2): 8  Independent Airway: airway patency satisfactory and stable  Dentition: dentition unchanged  Vital Signs Stable: post-procedure vital signs reviewed and stable  Report to RN Given: handoff report given  Patient transferred to: PACU    Handoff Report: Identifed the Patient, Identified the Reponsible Provider, Reviewed the pertinent medical history, Discussed the surgical course, Reviewed Intra-OP anesthesia mangement and issues during anesthesia, Set expectations for post-procedure period and Allowed opportunity for questions and acknowledgement of understanding      Vitals:  Vitals Value Taken Time   /75 01/03/23 1638   Temp 36.6  C (97.8  F) 01/03/23 1637   Pulse 75 01/03/23 1640   Resp 25 01/03/23 1640   SpO2 100 % 01/03/23 1640   Vitals shown include unvalidated device data.    Electronically Signed By: LAYO Molina CRNA  January 3, 2023  4:42 PM

## 2023-01-03 NOTE — ANESTHESIA POSTPROCEDURE EVALUATION
Patient: Latanya Triplett    Procedure: Procedure(s):  MODIFIED LEFT RADICAL VULVECTOMY, LEFT SENTINEL LYMPH NODE INJECTION AND BIOPSY, LYMPHOSCINTIGRAPHY NUCLEAR MEDICINE INJECTION       Anesthesia Type:  General    Note:  Disposition: Inpatient   Postop Pain Control: Uneventful            Sign Out: Well controlled pain   PONV: No   Neuro/Psych: Uneventful            Sign Out: Acceptable/Baseline neuro status   Airway/Respiratory: Uneventful            Sign Out: Acceptable/Baseline resp. status   CV/Hemodynamics: Uneventful            Sign Out: Acceptable CV status; No obvious hypovolemia; No obvious fluid overload   Other NRE: NONE   DID A NON-ROUTINE EVENT OCCUR? No           Last vitals:  Vitals Value Taken Time   /77 01/03/23 1715   Temp 36.6  C (97.8  F) 01/03/23 1637   Pulse 63 01/03/23 1727   Resp 19 01/03/23 1727   SpO2 100 % 01/03/23 1727   Vitals shown include unvalidated device data.    Electronically Signed By: Fredo Hinson MD  January 3, 2023  5:29 PM

## 2023-01-10 LAB
PATH REPORT.ADDENDUM SPEC: NORMAL
PATH REPORT.COMMENTS IMP SPEC: NORMAL
PATH REPORT.COMMENTS IMP SPEC: NORMAL
PATH REPORT.FINAL DX SPEC: NORMAL
PATH REPORT.GROSS SPEC: NORMAL
PATH REPORT.MICROSCOPIC SPEC OTHER STN: NORMAL
PATH REPORT.RELEVANT HX SPEC: NORMAL
PHOTO IMAGE: NORMAL

## 2023-12-31 ENCOUNTER — HEALTH MAINTENANCE LETTER (OUTPATIENT)
Age: 48
End: 2023-12-31

## 2024-12-15 ENCOUNTER — HEALTH MAINTENANCE LETTER (OUTPATIENT)
Age: 49
End: 2024-12-15

## 2025-01-19 ENCOUNTER — HEALTH MAINTENANCE LETTER (OUTPATIENT)
Age: 50
End: 2025-01-19

## (undated) DEVICE — SPONGE RAY-TEC 4X8" 7318

## (undated) DEVICE — GLOVE BIOGEL PI ULTRATOUCH G SZ 7.0 42170

## (undated) DEVICE — GLOVE BIOGEL PI ULTRATOUCH G SZ 6.5 42165

## (undated) DEVICE — SYR 10ML FINGER CONTROL W/O NDL 309695

## (undated) DEVICE — DRSG KERLIX FLUFFS X5

## (undated) DEVICE — BLADE CLIPPER PIVOT PURPLE DISP 9660

## (undated) DEVICE — PREP DYNA-HEX 4% CHG SCRUB 4OZ BOTTLE MDS098710

## (undated) DEVICE — CUSTOM PACK PERI GYN SMA5BPGHEA

## (undated) DEVICE — SU SILK 2-0 FS-1 18" 685G

## (undated) DEVICE — SYR 10ML LL W/O NDL 302995

## (undated) DEVICE — SUCTION TIP YANKAUER W/O VENT K86

## (undated) DEVICE — GOWN IMPERVIOUS BREATHABLE SMART LG 89015

## (undated) DEVICE — SUTURE VICRYL+ 2-0 27IN CT-1 UND VCP259H

## (undated) DEVICE — PLATE GROUNDING ADULT W/CORD 9165L

## (undated) DEVICE — NEEDLE HYPO 22X1-1/2 SAFETY 305900

## (undated) DEVICE — SUCTION MANIFOLD NEPTUNE 2 SYS 1 PORT 702-025-000

## (undated) DEVICE — MAT FLOOR SURGICAL 40X38 0702140238

## (undated) DEVICE — SU STRATAFIX MONOCRYL 3-0 SPIRAL PS-2 30CM SXMP1B106

## (undated) DEVICE — SYR BULB IRRIG DOVER 60 ML LATEX FREE 67000

## (undated) DEVICE — ESU PENCIL SMOKE EVAC W/ROCKER SWITCH 0703-047-000

## (undated) DEVICE — PREP SCRUB SOL EXIDINE 4% CHG 4OZ 29002-404

## (undated) DEVICE — PREP CHLORAPREP 26ML TINTED HI-LITE ORANGE 930815

## (undated) DEVICE — SUTURE VICRYL+ 3-0 18 SH/CR UND VCP864

## (undated) DEVICE — ESU LIGASURE OPEN SEALER/DIVIDER SM JAW 16.5MM LF1212A

## (undated) DEVICE — DECANTER VIAL 2006S

## (undated) DEVICE — SUTURE VICRYL+ 2-0 CT-2 27" UND VCP269H

## (undated) DEVICE — GLOVE UNDER INDICATOR PI SZ 7.0 LF 41670

## (undated) DEVICE — SOL WATER IRRIG 1000ML BOTTLE 2F7114

## (undated) DEVICE — DRAPE IOBAN INCISE 23X17" 6650EZ

## (undated) DEVICE — SU MONOCRYL+ 4-0 18IN PS2 UND MCP496G

## (undated) DEVICE — ADH SKIN CLOSURE PREMIERPRO EXOFIN 1.0ML 3470

## (undated) DEVICE — BRIEF STRETCH XL MPS40

## (undated) DEVICE — LUBRICANT SURG 2 OZ STRL FLIP TOP 2OZLUB

## (undated) DEVICE — SU SILK 2-0 SH 30" K833H

## (undated) DEVICE — SOL NACL 0.9% IRRIG 1000ML BOTTLE 2F7124

## (undated) DEVICE — COVER ULTRASOUND PROBE FLEXI-FEEL 4X96" 25-FF496

## (undated) RX ORDER — KETAMINE HYDROCHLORIDE 10 MG/ML
INJECTION INTRAMUSCULAR; INTRAVENOUS
Status: DISPENSED
Start: 2023-01-03

## (undated) RX ORDER — FENTANYL CITRATE 50 UG/ML
INJECTION, SOLUTION INTRAMUSCULAR; INTRAVENOUS
Status: DISPENSED
Start: 2022-11-15

## (undated) RX ORDER — PROPOFOL 10 MG/ML
INJECTION, EMULSION INTRAVENOUS
Status: DISPENSED
Start: 2023-01-03

## (undated) RX ORDER — DEXAMETHASONE SODIUM PHOSPHATE 10 MG/ML
INJECTION, SOLUTION INTRAMUSCULAR; INTRAVENOUS
Status: DISPENSED
Start: 2022-11-15

## (undated) RX ORDER — FENTANYL CITRATE-0.9 % NACL/PF 10 MCG/ML
PLASTIC BAG, INJECTION (ML) INTRAVENOUS
Status: DISPENSED
Start: 2022-11-15

## (undated) RX ORDER — PROPOFOL 10 MG/ML
INJECTION, EMULSION INTRAVENOUS
Status: DISPENSED
Start: 2022-11-15

## (undated) RX ORDER — ONDANSETRON 2 MG/ML
INJECTION INTRAMUSCULAR; INTRAVENOUS
Status: DISPENSED
Start: 2022-11-15

## (undated) RX ORDER — LIDOCAINE HYDROCHLORIDE 20 MG/ML
INJECTION, SOLUTION INFILTRATION; PERINEURAL
Status: DISPENSED
Start: 2022-11-15

## (undated) RX ORDER — LIDOCAINE HYDROCHLORIDE 10 MG/ML
INJECTION, SOLUTION EPIDURAL; INFILTRATION; INTRACAUDAL; PERINEURAL
Status: DISPENSED
Start: 2022-11-15

## (undated) RX ORDER — FENTANYL CITRATE 50 UG/ML
INJECTION, SOLUTION INTRAMUSCULAR; INTRAVENOUS
Status: DISPENSED
Start: 2023-01-03

## (undated) RX ORDER — BUPIVACAINE HYDROCHLORIDE 2.5 MG/ML
INJECTION, SOLUTION EPIDURAL; INFILTRATION; INTRACAUDAL
Status: DISPENSED
Start: 2023-01-03